# Patient Record
Sex: MALE | Race: WHITE | NOT HISPANIC OR LATINO | Employment: FULL TIME | URBAN - NONMETROPOLITAN AREA
[De-identification: names, ages, dates, MRNs, and addresses within clinical notes are randomized per-mention and may not be internally consistent; named-entity substitution may affect disease eponyms.]

---

## 2023-09-25 ENCOUNTER — TELEPHONE (OUTPATIENT)
Dept: FAMILY MEDICINE CLINIC | Facility: CLINIC | Age: 38
End: 2023-09-25

## 2023-09-25 NOTE — TELEPHONE ENCOUNTER
Caller: CAIN JONES    Relationship: SELF    Best call back number: [phone]     What medication are you requesting: TADALAFIL 5 MG    If a prescription is needed, what is your preferred pharmacy and phone number:  [facility]    Additional notes:  PATIENT OUT OF MEDICATION.

## 2023-09-25 NOTE — TELEPHONE ENCOUNTER
Caller: Pepe Albert    Relationship: Self    Best call back number: [phone]     Requested Prescriptions:   Requested Prescriptions      No prescriptions requested or ordered in this encounter    TADALAFIL 5 MG - PATIENT STATES IT IS NOW THE SAME PRICE FOR 10 MG AND IS REQUESTING TO HAVE 10 MG PRESCRIBED AND HE WILL CUT THE TABLETS IN HALF.     Pharmacy where request should be sent: [address] [phone] [facility] [phone]      Last office visit with prescribing clinician: Visit date not found   Last telemedicine visit with prescribing clinician: Visit date not found   Next office visit with prescribing clinician: Visit date not found     Additional details provided by patient: PATIENT IS REQUESTING TO HAVE THE PHARMACY LISTED ABOVE AS [facility] HAS CLOSED DOWN.     Does the patient have less than a 3 day supply:  [x] Yes  [] No    PATIENT STATES HE WAS LAST SEEN BY MD GANN IN EARLY 2023 VIRTUALLY.     Angel Gutiérrez Rep   09/25/23 15:54 EDT

## 2025-03-21 ENCOUNTER — OFFICE VISIT (OUTPATIENT)
Dept: FAMILY MEDICINE CLINIC | Facility: CLINIC | Age: 40
End: 2025-03-21
Payer: COMMERCIAL

## 2025-03-21 VITALS
HEIGHT: 72 IN | BODY MASS INDEX: 26.66 KG/M2 | HEART RATE: 87 BPM | OXYGEN SATURATION: 99 % | SYSTOLIC BLOOD PRESSURE: 134 MMHG | RESPIRATION RATE: 18 BRPM | DIASTOLIC BLOOD PRESSURE: 94 MMHG | WEIGHT: 196.8 LBS | TEMPERATURE: 97.8 F

## 2025-03-21 DIAGNOSIS — N52.9 ERECTILE DYSFUNCTION, UNSPECIFIED ERECTILE DYSFUNCTION TYPE: ICD-10-CM

## 2025-03-21 DIAGNOSIS — D56.1 BETA THALASSEMIA: ICD-10-CM

## 2025-03-21 DIAGNOSIS — R03.0 ELEVATED BLOOD PRESSURE READING: ICD-10-CM

## 2025-03-21 DIAGNOSIS — B00.9 HSV (HERPES SIMPLEX VIRUS) INFECTION: ICD-10-CM

## 2025-03-21 DIAGNOSIS — Z11.3 SCREENING EXAMINATION FOR STD (SEXUALLY TRANSMITTED DISEASE): ICD-10-CM

## 2025-03-21 DIAGNOSIS — R10.33 ABDOMINAL WALL PAIN IN PERIUMBILICAL REGION: ICD-10-CM

## 2025-03-21 DIAGNOSIS — Z00.00 ENCOUNTER FOR ROUTINE ADULT HEALTH EXAMINATION WITHOUT ABNORMAL FINDINGS: Primary | ICD-10-CM

## 2025-03-21 RX ORDER — VALACYCLOVIR HYDROCHLORIDE 500 MG/1
500 TABLET, FILM COATED ORAL 2 TIMES DAILY
COMMUNITY
End: 2025-03-21 | Stop reason: SDUPTHER

## 2025-03-21 RX ORDER — TADALAFIL 20 MG/1
40 TABLET ORAL
COMMUNITY
End: 2025-03-21

## 2025-03-21 RX ORDER — TADALAFIL 5 MG/1
5 TABLET ORAL DAILY PRN
Qty: 30 TABLET | Refills: 11 | Status: SHIPPED | OUTPATIENT
Start: 2025-03-21

## 2025-03-21 RX ORDER — TADALAFIL 5 MG/1
5 TABLET ORAL DAILY PRN
Qty: 30 TABLET | Refills: 11 | Status: SHIPPED | OUTPATIENT
Start: 2025-03-21 | End: 2025-03-21

## 2025-03-21 RX ORDER — VALACYCLOVIR HYDROCHLORIDE 500 MG/1
TABLET, FILM COATED ORAL
Qty: 20 TABLET | Refills: 2 | Status: SHIPPED | OUTPATIENT
Start: 2025-03-21

## 2025-03-21 NOTE — PROGRESS NOTES
"  Established Patient Office Visit        Subjective      Chief Complaint:  Annual Exam (Elevated bp, [facility] care, seen Dr. Huerta, hematologist in [place] for thalasemia)      History of Present Illness: Pepe Albert is a 40 y.o. male who presents for thalassemia     Recently hit foot with chair and went to urgent care. Having some toe pain as well. Left foot.   Xray reviewed of foot 3/6 no fracture     Reviewed labs from 2020 CBC and iron. Iron normal   Hemoglobin 13 in 2020     Elevated blood pressure at urgent care.     10 years of belly bottom pain  Previous nrmal colonsocopy     Patient Active Problem List   Diagnosis    Beta thalassemia    Erectile dysfunction    Herpes simplex     Screening:  Labs ordered   No FH of prostate     UTD on TDAP   Declines flu shot     Discussed healthy diet     PHQ-2 Depression Screening  Little interest or pleasure in doing things? Not at all   Feeling down, depressed, or hopeless? Not at all   PHQ-2 Total Score 0         Current Outpatient Medications:     tadalafil (CIALIS) 5 MG tablet, Take 1 tablet by mouth Daily As Needed for Erectile Dysfunction., Disp: 30 tablet, Rfl: 11    valACYclovir (VALTREX) 500 MG tablet, Take 2 tablets twice daily for 1 day for new flair, Disp: 20 tablet, Rfl: 2       Objective     Physical Exam:   Vital Signs:   /94 (BP Location: Right arm, Patient Position: Sitting, Cuff Size: Adult)   Pulse 87   Temp 97.8 °F (36.6 °C) (Temporal)   Resp 18   Ht 182.9 cm (72\")   Wt 89.3 kg (196 lb 12.8 oz)   SpO2 99%   BMI 26.69 kg/m²      Physical Exam  Constitutional:       General: He is not in acute distress.     Appearance: He is not ill-appearing.   Cardiovascular:      Rate and Rhythm: Normal rate and regular rhythm.   Pulmonary:      Effort: Pulmonary effort is normal.      Breath sounds: Normal breath sounds.   Neurological:      Mental Status: He is alert.   Psychiatric:         Thought Content: Thought content normal.   Tenderness to the " superior umbilicus without hernia            Assessment / Plan      Assessment/Plan:   Diagnoses and all orders for this visit:    1. Encounter for routine adult health examination without abnormal findings (Primary)  -     Lipid Panel; Future  -     Comprehensive Metabolic Panel; Future  -     TSH Rfx On Abnormal To Free T4; Future  -     Hemoglobin A1c; Future    2. Beta thalassemia  -     CBC & Differential; Future    3. Erectile dysfunction, unspecified erectile dysfunction type  -     Discontinue: tadalafil (CIALIS) 5 MG tablet; Take 1 tablet by mouth Daily As Needed for Erectile Dysfunction.  Dispense: 30 tablet; Refill: 11  -     tadalafil (CIALIS) 5 MG tablet; Take 1 tablet by mouth Daily As Needed for Erectile Dysfunction.  Dispense: 30 tablet; Refill: 11    4. HSV (herpes simplex virus) infection  -     valACYclovir (VALTREX) 500 MG tablet; Take 2 tablets twice daily for 1 day for new flair  Dispense: 20 tablet; Refill: 2    5. Elevated blood pressure reading    6. Screening examination for STD (sexually transmitted disease)  -     Hepatitis C antibody; Future  -     HIV-1/O/2 Ag/Ab w Reflex; Future    7. Abdominal wall pain in periumbilical region    Follow-up for new or worsening symptoms, previous negative EGD EGD and colonoscopy.  If very bothersome to the patient we did discuss on option would be to do a CT scan however I find no evidence of hernia on exam    Get records from dr rae onclogreji holt     New problems to examiner that are chronic problems specifically addressed today and medication specifically addressed in addition to wellness visit today    Follow Up:   Return in about 3 months (around 6/21/2025) for Follow-up htn .    MDM:     Peter Newberry MD  Family Medicine - [facility] [place]

## 2025-04-02 ENCOUNTER — LAB (OUTPATIENT)
Dept: LAB | Facility: HOSPITAL | Age: 40
End: 2025-04-02
Payer: COMMERCIAL

## 2025-04-02 DIAGNOSIS — Z11.3 SCREENING EXAMINATION FOR STD (SEXUALLY TRANSMITTED DISEASE): ICD-10-CM

## 2025-04-02 DIAGNOSIS — D56.1 BETA THALASSEMIA: ICD-10-CM

## 2025-04-02 DIAGNOSIS — Z00.00 ENCOUNTER FOR ROUTINE ADULT HEALTH EXAMINATION WITHOUT ABNORMAL FINDINGS: ICD-10-CM

## 2025-04-02 LAB
ALBUMIN SERPL-MCNC: 4.6 G/DL (ref 3.5–5.2)
ALBUMIN/GLOB SERPL: 1.7 G/DL
ALP SERPL-CCNC: 59 U/L (ref 39–117)
ALT SERPL W P-5'-P-CCNC: 16 U/L (ref 1–41)
ANION GAP SERPL CALCULATED.3IONS-SCNC: 11.9 MMOL/L (ref 5–15)
AST SERPL-CCNC: 19 U/L (ref 1–40)
BASOPHILS # BLD MANUAL: 0.11 10*3/MM3 (ref 0–0.2)
BASOPHILS NFR BLD MANUAL: 2 % (ref 0–1.5)
BILIRUB SERPL-MCNC: 0.7 MG/DL (ref 0–1.2)
BUN SERPL-MCNC: 23 MG/DL (ref 6–20)
BUN/CREAT SERPL: 18.9 (ref 7–25)
CALCIUM SPEC-SCNC: 10 MG/DL (ref 8.6–10.5)
CHLORIDE SERPL-SCNC: 104 MMOL/L (ref 98–107)
CHOLEST SERPL-MCNC: 258 MG/DL (ref 0–200)
CO2 SERPL-SCNC: 24.1 MMOL/L (ref 22–29)
CREAT SERPL-MCNC: 1.22 MG/DL (ref 0.76–1.27)
DEPRECATED RDW RBC AUTO: 34.5 FL (ref 37–54)
EGFRCR SERPLBLD CKD-EPI 2021: 76.4 ML/MIN/1.73
EOSINOPHIL # BLD MANUAL: 0.34 10*3/MM3 (ref 0–0.4)
EOSINOPHIL NFR BLD MANUAL: 6.1 % (ref 0.3–6.2)
ERYTHROCYTE [DISTWIDTH] IN BLOOD BY AUTOMATED COUNT: 17.8 % (ref 12.3–15.4)
GLOBULIN UR ELPH-MCNC: 2.7 GM/DL
GLUCOSE SERPL-MCNC: 87 MG/DL (ref 65–99)
HBA1C MFR BLD: 5 % (ref 4.8–5.6)
HCT VFR BLD AUTO: 43.9 % (ref 37.5–51)
HCV AB SER QL: NORMAL
HDLC SERPL-MCNC: 64 MG/DL (ref 40–60)
HGB BLD-MCNC: 13.5 G/DL (ref 13–17.7)
HIV 1+2 AB+HIV1 P24 AG SERPL QL IA: NORMAL
LDLC SERPL CALC-MCNC: 172 MG/DL (ref 0–100)
LDLC/HDLC SERPL: 2.65 {RATIO}
LYMPHOCYTES # BLD MANUAL: 2.74 10*3/MM3 (ref 0.7–3.1)
LYMPHOCYTES NFR BLD MANUAL: 5.1 % (ref 5–12)
MCH RBC QN AUTO: 19.4 PG (ref 26.6–33)
MCHC RBC AUTO-ENTMCNC: 30.8 G/DL (ref 31.5–35.7)
MCV RBC AUTO: 63.1 FL (ref 79–97)
MICROCYTES BLD QL: ABNORMAL
MONOCYTES # BLD: 0.29 10*3/MM3 (ref 0.1–0.9)
NEUTROPHILS # BLD AUTO: 2.12 10*3/MM3 (ref 1.7–7)
NEUTROPHILS NFR BLD MANUAL: 37.8 % (ref 42.7–76)
NRBC BLD AUTO-RTO: 0 /100 WBC (ref 0–0.2)
PLAT MORPH BLD: NORMAL
PLATELET # BLD AUTO: 239 10*3/MM3 (ref 140–450)
PMV BLD AUTO: 10.5 FL (ref 6–12)
POTASSIUM SERPL-SCNC: 4.3 MMOL/L (ref 3.5–5.2)
PROT SERPL-MCNC: 7.3 G/DL (ref 6–8.5)
RBC # BLD AUTO: 6.96 10*6/MM3 (ref 4.14–5.8)
SODIUM SERPL-SCNC: 140 MMOL/L (ref 136–145)
TRIGL SERPL-MCNC: 122 MG/DL (ref 0–150)
TSH SERPL DL<=0.05 MIU/L-ACNC: 1.93 UIU/ML (ref 0.27–4.2)
VARIANT LYMPHS NFR BLD MANUAL: 49 % (ref 19.6–45.3)
VLDLC SERPL-MCNC: 22 MG/DL (ref 5–40)
WBC MORPH BLD: NORMAL
WBC NRBC COR # BLD AUTO: 5.6 10*3/MM3 (ref 3.4–10.8)

## 2025-04-02 PROCEDURE — 86803 HEPATITIS C AB TEST: CPT

## 2025-04-02 PROCEDURE — 83036 HEMOGLOBIN GLYCOSYLATED A1C: CPT

## 2025-04-02 PROCEDURE — 80061 LIPID PANEL: CPT

## 2025-04-02 PROCEDURE — G0432 EIA HIV-1/HIV-2 SCREEN: HCPCS

## 2025-04-02 PROCEDURE — 85007 BL SMEAR W/DIFF WBC COUNT: CPT

## 2025-04-02 PROCEDURE — 36415 COLL VENOUS BLD VENIPUNCTURE: CPT

## 2025-04-02 PROCEDURE — 80050 GENERAL HEALTH PANEL: CPT

## 2025-06-16 ENCOUNTER — LAB (OUTPATIENT)
Dept: LAB | Facility: HOSPITAL | Age: 40
End: 2025-06-16
Payer: COMMERCIAL

## 2025-06-16 DIAGNOSIS — E78.2 MIXED HYPERLIPIDEMIA: ICD-10-CM

## 2025-06-16 LAB
CHOLEST SERPL-MCNC: 253 MG/DL (ref 0–200)
HDLC SERPL-MCNC: 65 MG/DL (ref 40–60)
LDLC SERPL CALC-MCNC: 164 MG/DL (ref 0–100)
LDLC/HDLC SERPL: 2.48 {RATIO}
TRIGL SERPL-MCNC: 135 MG/DL (ref 0–150)
VLDLC SERPL-MCNC: 24 MG/DL (ref 5–40)

## 2025-06-16 PROCEDURE — 36415 COLL VENOUS BLD VENIPUNCTURE: CPT

## 2025-06-16 PROCEDURE — 83695 ASSAY OF LIPOPROTEIN(A): CPT

## 2025-06-16 PROCEDURE — 80061 LIPID PANEL: CPT

## 2025-06-17 LAB — LPA SERPL-SCNC: 32.5 NMOL/L

## 2025-06-20 ENCOUNTER — OFFICE VISIT (OUTPATIENT)
Dept: FAMILY MEDICINE CLINIC | Facility: CLINIC | Age: 40
End: 2025-06-20
Payer: COMMERCIAL

## 2025-06-20 VITALS
DIASTOLIC BLOOD PRESSURE: 80 MMHG | RESPIRATION RATE: 16 BRPM | SYSTOLIC BLOOD PRESSURE: 120 MMHG | TEMPERATURE: 98.6 F | BODY MASS INDEX: 24.6 KG/M2 | HEART RATE: 68 BPM | OXYGEN SATURATION: 96 % | WEIGHT: 181.4 LBS

## 2025-06-20 DIAGNOSIS — Z00.00 ROUTINE GENERAL MEDICAL EXAMINATION AT A HEALTH CARE FACILITY: ICD-10-CM

## 2025-06-20 DIAGNOSIS — N52.9 ERECTILE DYSFUNCTION, UNSPECIFIED ERECTILE DYSFUNCTION TYPE: ICD-10-CM

## 2025-06-20 DIAGNOSIS — E78.5 DYSLIPIDEMIA: Primary | ICD-10-CM

## 2025-06-20 NOTE — PROGRESS NOTES
Established Patient Office Visit        Subjective      Chief Complaint:  Hypertension      History of Present Illness: Pepe Albert is a 40 y.o. male who presents for follow-up of elevated blood pressure and hyperlipidemia.  He has worked on improving his diet his pressures been in the 100's over 70s      Patient Active Problem List   Diagnosis    Beta thalassemia    Erectile dysfunction    Herpes simplex    Dyslipidemia         Current Outpatient Medications:     tadalafil (CIALIS) 5 MG tablet, Take 1 tablet by mouth Daily As Needed for Erectile Dysfunction., Disp: 30 tablet, Rfl: 11    valACYclovir (VALTREX) 500 MG tablet, Take 2 tablets twice daily for 1 day for new flair, Disp: 20 tablet, Rfl: 2       Objective     Physical Exam:   Vital Signs:   /80 (BP Location: Left arm, Patient Position: Sitting, Cuff Size: Adult)   Pulse 68   Temp 98.6 °F (37 °C) (Infrared)   Resp 16   Wt 82.3 kg (181 lb 6.4 oz)   SpO2 96%   BMI 24.60 kg/m²      Physical Exam  Constitutional:       General: He is not in acute distress.     Appearance: He is not ill-appearing.   Cardiovascular:      Rate and Rhythm: Normal rate and regular rhythm.   Pulmonary:      Effort: Pulmonary effort is normal.      Breath sounds: Normal breath sounds.   Neurological:      Mental Status: He is alert.   Psychiatric:         Thought Content: Thought content normal.            Assessment / Plan      Assessment/Plan:   Diagnoses and all orders for this visit:    1. Dyslipidemia (Primary)  Assessment & Plan:  .  ASCVD risk 1% no family history of heart disease consider coronary artery calcium score between 45-50.    No medicine recommended for now    Discussed healthy diet for lowering cholesterol.      2. Erectile dysfunction, unspecified erectile dysfunction type  Assessment & Plan:  Tolerating cialis.       3. Routine general medical examination at a health care facility  -     Comprehensive Metabolic Panel; Future  -     Lipid  Panel; Future  -     Hemoglobin A1c; Future  -     CBC & Differential; Future  -     TSH Rfx On Abnormal To Free T4; Future       Blood pressure normal at home no further management required    Follow Up:   Return in about 11 months (around 5/20/2026) for Wellness visit.    MDM:     Peter Newberry MD  Family Medicine - [facility]

## 2025-06-20 NOTE — ASSESSMENT & PLAN NOTE
.  ASCVD risk 1% no family history of heart disease consider coronary artery calcium score between 45-50.    No medicine recommended for now    Discussed healthy diet for lowering cholesterol.